# Patient Record
Sex: FEMALE | Race: OTHER
[De-identification: names, ages, dates, MRNs, and addresses within clinical notes are randomized per-mention and may not be internally consistent; named-entity substitution may affect disease eponyms.]

---

## 2018-05-21 ENCOUNTER — HOSPITAL ENCOUNTER (EMERGENCY)
Dept: HOSPITAL 54 - ER | Age: 38
Discharge: HOME | End: 2018-05-21
Payer: COMMERCIAL

## 2018-05-21 VITALS — BODY MASS INDEX: 28.93 KG/M2 | WEIGHT: 180 LBS | HEIGHT: 66 IN

## 2018-05-21 VITALS — DIASTOLIC BLOOD PRESSURE: 101 MMHG | SYSTOLIC BLOOD PRESSURE: 140 MMHG

## 2018-05-21 DIAGNOSIS — R04.0: Primary | ICD-10-CM

## 2018-05-21 DIAGNOSIS — J95.831: ICD-10-CM

## 2018-05-21 DIAGNOSIS — I10: ICD-10-CM

## 2018-05-21 LAB
APTT PPP: 23 SEC (ref 23–34)
BASOPHILS # BLD AUTO: 0 /CMM (ref 0–0.2)
BASOPHILS NFR BLD AUTO: 2 % (ref 0–2)
BASOPHILS NFR BLD MANUAL: 0 % (ref 0–2)
BUN SERPL-MCNC: 14 MG/DL (ref 7–18)
CALCIUM SERPL-MCNC: 8.7 MG/DL (ref 8.5–10.1)
CHLORIDE SERPL-SCNC: 103 MMOL/L (ref 98–107)
CO2 SERPL-SCNC: 26 MMOL/L (ref 21–32)
CREAT SERPL-MCNC: 1 MG/DL (ref 0.6–1.3)
EOSINOPHIL NFR BLD AUTO: 4.7 % (ref 0–6)
EOSINOPHIL NFR BLD MANUAL: 6 % (ref 0–4)
GLUCOSE SERPL-MCNC: 111 MG/DL (ref 74–106)
HCT VFR BLD AUTO: 36 % (ref 33–45)
HGB BLD-MCNC: 13 G/DL (ref 11.5–14.8)
INR PPP: 1 (ref 0.85–1.15)
LYMPHOCYTES NFR BLD AUTO: 0.9 /CMM (ref 0.8–4.8)
LYMPHOCYTES NFR BLD AUTO: 36.3 % (ref 20–44)
LYMPHOCYTES NFR BLD MANUAL: 34 % (ref 16–48)
MCHC RBC AUTO-ENTMCNC: 36 G/DL (ref 31–36)
MCV RBC AUTO: 97 FL (ref 82–100)
MONOCYTES NFR BLD AUTO: 0.4 /CMM (ref 0.1–1.3)
MONOCYTES NFR BLD AUTO: 15.7 % (ref 2–12)
MONOCYTES NFR BLD MANUAL: 9 % (ref 0–11)
NEUTROPHILS # BLD AUTO: 1.1 /CMM (ref 1.8–8.9)
NEUTROPHILS NFR BLD AUTO: 41.3 % (ref 43–81)
NEUTS BAND NFR BLD MANUAL: 2 % (ref 0–5)
NEUTS SEG NFR BLD MANUAL: 49 % (ref 42–76)
PLATELET # BLD AUTO: 293 /CMM (ref 150–450)
POTASSIUM SERPL-SCNC: 4 MMOL/L (ref 3.5–5.1)
RBC # BLD AUTO: 3.71 MIL/UL (ref 4–5.2)
RDW COEFFICIENT OF VARIATION: 12 (ref 11.5–15)
SODIUM SERPL-SCNC: 136 MMOL/L (ref 136–145)
WBC NRBC COR # BLD AUTO: 2.5 K/UL (ref 4.3–11)

## 2018-05-21 PROCEDURE — 36415 COLL VENOUS BLD VENIPUNCTURE: CPT

## 2018-05-21 PROCEDURE — 96374 THER/PROPH/DIAG INJ IV PUSH: CPT

## 2018-05-21 PROCEDURE — A4606 OXYGEN PROBE USED W OXIMETER: HCPCS

## 2018-05-21 PROCEDURE — 30901 CONTROL OF NOSEBLEED: CPT

## 2018-05-21 PROCEDURE — 96361 HYDRATE IV INFUSION ADD-ON: CPT

## 2018-05-21 PROCEDURE — 80048 BASIC METABOLIC PNL TOTAL CA: CPT

## 2018-05-21 PROCEDURE — 96375 TX/PRO/DX INJ NEW DRUG ADDON: CPT

## 2018-05-21 PROCEDURE — 99291 CRITICAL CARE FIRST HOUR: CPT

## 2018-05-21 PROCEDURE — Z7610: HCPCS

## 2018-05-21 PROCEDURE — 85730 THROMBOPLASTIN TIME PARTIAL: CPT

## 2018-05-21 PROCEDURE — 85025 COMPLETE CBC W/AUTO DIFF WBC: CPT

## 2018-05-21 NOTE — NUR
[id number] FROM HOME FOR LEFT NARES NOSEBLEED FOR 30 MIN NOW. PT SAID SHE HAD 
ANOTHER EPISODE OF NOSEBLEED AT 0830 AM TODAY AND SHE WENT TO URGENT CARE, AND 
THEY MANAGED TO STOP IT. PT HAD A PROCEDURE OF TURBINATEEXCISION TO HER NOSE 
WITH DR. MATA 1 MONTH AGO. DENIES PAIN BUT FEELING DIZZY AT THIS TIME. WILL 
CONT TO MONITOR

## 2018-05-21 NOTE — NUR
Patient discharged to home in stable condition. Written and verbal after care 
instructions given. Patient verbalizes understanding of instruction. IV 
removed. Catheter intact and site benign. Pressure and 4x4 applied to site. No 
bleeding noted. PT TAKEN VIA WC TO DR. MATA'S OFFICE. VSS. COPY OF LABS WITH 
PT.

## 2018-05-21 NOTE — NUR
ASSUMED CARE OF PT. PT HAS RHINO ROCKET IN LEFT NARE. SMALL AMOUNT OF SANJANA 
BLOOD DRIPPING FROM RT NARE. PT IS DIAPHORETIC AND WEAK. VSS. PT'S PULSE OX IS 
100% ON RA. PT IS C/O LEFT NARE PAIN. PT HAD A PROCEDURE 1 MONTH AGO WITH DR. MATA, OTOLARYNGOLOGIST/ENT. PT'S  IS AT THE BEDSIDE. PT IS C/O HAVING 
TO GO TO THE BATHROOM.